# Patient Record
Sex: MALE | Race: ASIAN | NOT HISPANIC OR LATINO | URBAN - METROPOLITAN AREA
[De-identification: names, ages, dates, MRNs, and addresses within clinical notes are randomized per-mention and may not be internally consistent; named-entity substitution may affect disease eponyms.]

---

## 2017-01-04 ENCOUNTER — EMERGENCY (EMERGENCY)
Facility: HOSPITAL | Age: 37
LOS: 1 days | Discharge: HOME | End: 2017-01-04
Attending: EMERGENCY MEDICINE | Admitting: EMERGENCY MEDICINE
Payer: OTHER MISCELLANEOUS

## 2017-01-04 VITALS
WEIGHT: 199.96 LBS | SYSTOLIC BLOOD PRESSURE: 141 MMHG | RESPIRATION RATE: 16 BRPM | OXYGEN SATURATION: 95 % | DIASTOLIC BLOOD PRESSURE: 87 MMHG | TEMPERATURE: 98 F | HEART RATE: 104 BPM | HEIGHT: 74 IN

## 2017-01-04 VITALS
RESPIRATION RATE: 19 BRPM | HEART RATE: 90 BPM | OXYGEN SATURATION: 96 % | TEMPERATURE: 98 F | SYSTOLIC BLOOD PRESSURE: 138 MMHG | DIASTOLIC BLOOD PRESSURE: 86 MMHG
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
## 2017-01-04 DIAGNOSIS — S90.02XA CONTUSION OF LEFT ANKLE, INITIAL ENCOUNTER: ICD-10-CM

## 2017-01-04 DIAGNOSIS — M25.572 PAIN IN LEFT ANKLE AND JOINTS OF LEFT FOOT: ICD-10-CM

## 2017-01-04 PROCEDURE — 99283 EMERGENCY DEPT VISIT LOW MDM: CPT | Mod: 25

## 2017-01-04 PROCEDURE — 73610 X-RAY EXAM OF ANKLE: CPT | Mod: 26,LT

## 2017-01-04 PROCEDURE — 99283 EMERGENCY DEPT VISIT LOW MDM: CPT

## 2017-01-04 PROCEDURE — 73610 X-RAY EXAM OF ANKLE: CPT
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
## 2018-05-11 ENCOUNTER — EMERGENCY (EMERGENCY)
Facility: HOSPITAL | Age: 38
LOS: 1 days | Discharge: HOME | End: 2018-05-11
Admitting: EMERGENCY MEDICINE
Payer: SELF-PAY

## 2018-05-11 VITALS
TEMPERATURE: 98 F | WEIGHT: 199.96 LBS | DIASTOLIC BLOOD PRESSURE: 61 MMHG | SYSTOLIC BLOOD PRESSURE: 102 MMHG | HEIGHT: 74 IN | RESPIRATION RATE: 16 BRPM | HEART RATE: 86 BPM | OXYGEN SATURATION: 98 %
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
## 2018-05-11 DIAGNOSIS — M54.9 DORSALGIA, UNSPECIFIED: ICD-10-CM
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
## 2018-05-11 DIAGNOSIS — S70.01XA CONTUSION OF RIGHT HIP, INITIAL ENCOUNTER: ICD-10-CM

## 2018-05-11 DIAGNOSIS — S70.11XA CONTUSION OF RIGHT THIGH, INITIAL ENCOUNTER: ICD-10-CM
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
## 2018-05-11 PROCEDURE — 99284 EMERGENCY DEPT VISIT MOD MDM: CPT | Mod: 25

## 2018-05-11 PROCEDURE — 73502 X-RAY EXAM HIP UNI 2-3 VIEWS: CPT | Mod: 26,RT

## 2018-05-11 PROCEDURE — 99283 EMERGENCY DEPT VISIT LOW MDM: CPT

## 2018-05-11 PROCEDURE — 96372 THER/PROPH/DIAG INJ SC/IM: CPT

## 2018-05-11 PROCEDURE — 73552 X-RAY EXAM OF FEMUR 2/>: CPT

## 2018-05-11 PROCEDURE — 73502 X-RAY EXAM HIP UNI 2-3 VIEWS: CPT

## 2018-05-11 PROCEDURE — 73552 X-RAY EXAM OF FEMUR 2/>: CPT | Mod: 26,RT

## 2018-05-11 RX ORDER — KETOROLAC TROMETHAMINE 30 MG/ML
30 SYRINGE (ML) INJECTION ONCE
Qty: 0 | Refills: 0 | Status: DISCONTINUED | OUTPATIENT
Start: 2018-05-11 | End: 2018-05-11

## 2018-05-11 RX ORDER — IBUPROFEN 200 MG
1 TABLET ORAL
Qty: 15 | Refills: 0 | OUTPATIENT
Start: 2018-05-11 | End: 2018-05-15

## 2018-05-11 RX ADMIN — Medication 30 MILLIGRAM(S): at 19:36

## 2018-05-11 NOTE — ED ADULT NURSE NOTE - CHPI ED SYMPTOMS NEG
no bruising/no weakness/no back pain/no fever/no stiffness/no numbness/no tingling/no abrasion/no deformity

## 2018-05-11 NOTE — ED PROVIDER NOTE - OBJECTIVE STATEMENT
36 yo M with no pmh c/o R low back/thigh pain after he slipped and fell on the wet floor around 4pm. Pt landed on his buttock. Denies head injury. 38 yo M with no pmh c/o R hip/thigh pain after he slipped and fell on the wet floor around 4pm. Pt landed on his buttock/R hip. Denies head injury. Pain from hip radiating down the leg. Denies back pain. Denies numbness, tingling. Pain worse with walking.

## 2018-05-11 NOTE — ED PROVIDER NOTE - PHYSICAL EXAMINATION
CONSTITUTIONAL: Well-appearing; well-nourished; in no apparent distress.   HEAD: Normocephalic; atraumatic.   EYES: PERRL; EOM intact; conjunctiva and sclera clear  ENT: normal nose; no rhinorrhea; normal pharynx with no erythema or lesions.   NECK: Supple; non-tender;   CARDIOVASCULAR: Normal S1, S2; no murmurs, rubs, or gallops. Regular rate and rhythm.   RESPIRATORY: Breathing easily; breath sounds clear and equal bilaterally; no wheezes, rhonchi, or rales.  MSK: +tenderness to R hip and R anterior thigh, no ecchymosis or deformity. FROM at hip and knee. No spinal tenderness.

## 2018-05-11 NOTE — ED ADULT NURSE NOTE - OBJECTIVE STATEMENT
Pt reports falling d/t slipping today on right hip, denies numbness, tingling, toes are warm to touch. Pt denies hitting head, no LOC, not on any blood thinners.

## 2018-05-11 NOTE — ED PROVIDER NOTE - MEDICAL DECISION MAKING DETAILS
38 yo M with no pmh c/o R low back/thigh pain after he slipped and fell on the wet floor around 4pm. Pt landed on his buttock 38 yo M with no pmh c/o R hip/thigh pain after he slipped and fell on the wet floor around 4pm. Pt landed on his buttock/ R hip. Pain from hip radiating down to knee. No back pain. +tenderness to R hip and R anterior thigh, no ecchymosis or deformity. FROM at hip and knee. No spinal tenderness. 38 yo M with no pmh c/o R hip/thigh pain after he slipped and fell on the wet floor around 4pm. Pt landed on his buttock/ R hip. Pain from hip radiating down to knee. No back pain. +tenderness to R hip and R anterior thigh, no ecchymosis or deformity. FROM at hip and knee. No spinal tenderness. Xray neg for facture. NSAIDs, rest, supportive care

## 2018-12-05 NOTE — ED ADULT NURSE NOTE - CHIEF COMPLAINT
Telephone Encounter by Jo Boss RN at 02/19/18 03:59 PM     Author:  Jo Boss RN Service:  (none) Author Type:  Registered Nurse     Filed:  02/19/18 03:59 PM Encounter Date:  2/19/2018 Status:  Signed     :  Jo Boss RN (Registered Nurse)       From: Hope Neil  To: Nataly Qiu DO  Sent: 2/19/2018  3:05 PM CST  Subject: Lab Tests  Test Related Questions    Dr. Qiu,    I spoke with the nurse at Dr. Michelle's office (my surgeon) and she said   that if you wanted to order a CT Scan you should order it or if you want   to send Dr. Michelle a note as to why you are requesting it, their phone   number is [phone].    Thank you again for your help.    Cori Neil       Revision History        Date/Time User Provider Type Action    > 02/19/18 03:59 PM Jo Boss, RN Registered Nurse Sign    Attribution information within the note text is not available.             The patient is a 37y Male complaining of pain, mid back.
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
